# Patient Record
Sex: MALE | Race: BLACK OR AFRICAN AMERICAN | URBAN - METROPOLITAN AREA
[De-identification: names, ages, dates, MRNs, and addresses within clinical notes are randomized per-mention and may not be internally consistent; named-entity substitution may affect disease eponyms.]

---

## 2024-01-15 ENCOUNTER — CLAIMS CREATED FROM THE CLAIM WINDOW (OUTPATIENT)
Dept: URBAN - METROPOLITAN AREA MEDICAL CENTER 33 | Facility: MEDICAL CENTER | Age: 74
End: 2024-01-15
Payer: MEDICARE

## 2024-01-15 DIAGNOSIS — R57.1 HYPOVOLEMIC SHOCK: ICD-10-CM

## 2024-01-15 DIAGNOSIS — K26.4 BLEEDING DUODENAL ULCER: ICD-10-CM

## 2024-01-15 DIAGNOSIS — N17.9 ACUTE INJURY OF KIDNEY: ICD-10-CM

## 2024-01-15 DIAGNOSIS — K92.0 BLOODY EMESIS: ICD-10-CM

## 2024-01-15 DIAGNOSIS — D64.89 ANEMIA DUE TO OTHER CAUSE: ICD-10-CM

## 2024-01-15 PROCEDURE — 99255 IP/OBS CONSLTJ NEW/EST HI 80: CPT | Performed by: INTERNAL MEDICINE

## 2024-01-15 PROCEDURE — 43235 EGD DIAGNOSTIC BRUSH WASH: CPT | Performed by: INTERNAL MEDICINE

## 2024-01-15 PROCEDURE — 99223 1ST HOSP IP/OBS HIGH 75: CPT | Performed by: INTERNAL MEDICINE

## 2024-01-16 ENCOUNTER — CLAIMS CREATED FROM THE CLAIM WINDOW (OUTPATIENT)
Dept: URBAN - METROPOLITAN AREA MEDICAL CENTER 33 | Facility: MEDICAL CENTER | Age: 74
End: 2024-01-16
Payer: MEDICARE

## 2024-01-16 DIAGNOSIS — K92.0 BLOODY EMESIS: ICD-10-CM

## 2024-01-16 DIAGNOSIS — K92.1 ACUTE MELENA: ICD-10-CM

## 2024-01-16 DIAGNOSIS — D62 ABLA (ACUTE BLOOD LOSS ANEMIA): ICD-10-CM

## 2024-01-16 DIAGNOSIS — K26.4 BLEEDING DUODENAL ULCER: ICD-10-CM

## 2024-01-16 PROCEDURE — 99232 SBSQ HOSP IP/OBS MODERATE 35: CPT | Performed by: PHYSICIAN ASSISTANT

## 2024-01-17 ENCOUNTER — CLAIMS CREATED FROM THE CLAIM WINDOW (OUTPATIENT)
Dept: URBAN - METROPOLITAN AREA MEDICAL CENTER 33 | Facility: MEDICAL CENTER | Age: 74
End: 2024-01-17
Payer: MEDICARE

## 2024-01-17 DIAGNOSIS — K26.4 BLEEDING DUODENAL ULCER: ICD-10-CM

## 2024-01-17 DIAGNOSIS — N17.9 ACUTE INJURY OF KIDNEY: ICD-10-CM

## 2024-01-17 DIAGNOSIS — D62 ABLA (ACUTE BLOOD LOSS ANEMIA): ICD-10-CM

## 2024-01-17 DIAGNOSIS — K92.0 BLOODY EMESIS: ICD-10-CM

## 2024-01-17 PROCEDURE — 99232 SBSQ HOSP IP/OBS MODERATE 35: CPT | Performed by: PHYSICIAN ASSISTANT

## 2024-01-18 ENCOUNTER — CLAIMS CREATED FROM THE CLAIM WINDOW (OUTPATIENT)
Dept: URBAN - METROPOLITAN AREA MEDICAL CENTER 33 | Facility: MEDICAL CENTER | Age: 74
End: 2024-01-18
Payer: MEDICARE

## 2024-01-18 DIAGNOSIS — D62 ABLA (ACUTE BLOOD LOSS ANEMIA): ICD-10-CM

## 2024-01-18 DIAGNOSIS — K92.0 BLOODY EMESIS: ICD-10-CM

## 2024-01-18 DIAGNOSIS — K26.4 BLEEDING DUODENAL ULCER: ICD-10-CM

## 2024-01-18 DIAGNOSIS — N17.9 ACUTE INJURY OF KIDNEY: ICD-10-CM

## 2024-01-18 PROCEDURE — 99232 SBSQ HOSP IP/OBS MODERATE 35: CPT | Performed by: PHYSICIAN ASSISTANT

## 2024-01-20 ENCOUNTER — CLAIMS CREATED FROM THE CLAIM WINDOW (OUTPATIENT)
Dept: URBAN - METROPOLITAN AREA MEDICAL CENTER 33 | Facility: MEDICAL CENTER | Age: 74
End: 2024-01-20
Payer: MEDICARE

## 2024-01-20 DIAGNOSIS — D62 ABLA (ACUTE BLOOD LOSS ANEMIA): ICD-10-CM

## 2024-01-20 DIAGNOSIS — R13.12 DYSPHAGIA: ICD-10-CM

## 2024-01-20 DIAGNOSIS — K74.69 CIRRHOSIS, CRYPTOGENIC: ICD-10-CM

## 2024-01-20 DIAGNOSIS — K26.4 BLEEDING DUODENAL ULCER: ICD-10-CM

## 2024-01-20 PROCEDURE — 99232 SBSQ HOSP IP/OBS MODERATE 35: CPT | Performed by: INTERNAL MEDICINE

## 2024-01-21 ENCOUNTER — CLAIMS CREATED FROM THE CLAIM WINDOW (OUTPATIENT)
Dept: URBAN - METROPOLITAN AREA MEDICAL CENTER 33 | Facility: MEDICAL CENTER | Age: 74
End: 2024-01-21
Payer: MEDICARE

## 2024-01-21 DIAGNOSIS — K26.9 CHILDHOOD DUODENAL ULCER: ICD-10-CM

## 2024-01-21 DIAGNOSIS — K29.60 ADENOPAPILLOMATOSIS GASTRICA: ICD-10-CM

## 2024-01-21 DIAGNOSIS — D62 ABLA (ACUTE BLOOD LOSS ANEMIA): ICD-10-CM

## 2024-01-21 DIAGNOSIS — K31.A11 GASTRIC INTESTINAL METAPLASIA WITHOUT DYSPLASIA, INVOLVING THE ANTRUM: ICD-10-CM

## 2024-01-21 PROCEDURE — 43235 EGD DIAGNOSTIC BRUSH WASH: CPT | Performed by: INTERNAL MEDICINE

## 2024-01-21 PROCEDURE — 43239 EGD BIOPSY SINGLE/MULTIPLE: CPT | Performed by: INTERNAL MEDICINE

## 2024-02-09 ENCOUNTER — OV HOSPF/U (OUTPATIENT)
Dept: URBAN - METROPOLITAN AREA CLINIC 105 | Facility: CLINIC | Age: 74
End: 2024-02-09
Payer: MEDICARE

## 2024-02-09 VITALS
HEART RATE: 98 BPM | SYSTOLIC BLOOD PRESSURE: 94 MMHG | WEIGHT: 138 LBS | BODY MASS INDEX: 20.92 KG/M2 | HEIGHT: 68 IN | DIASTOLIC BLOOD PRESSURE: 54 MMHG | TEMPERATURE: 97.9 F

## 2024-02-09 DIAGNOSIS — D69.6 THROMBOCYTOPENIA: ICD-10-CM

## 2024-02-09 DIAGNOSIS — Z87.898 HISTORY OF SEIZURE: ICD-10-CM

## 2024-02-09 DIAGNOSIS — S06.9XAS TRAUMATIC BRAIN INJURY, WITH UNKNOWN LOSS OF CONSCIOUSNESS STATUS, SEQUELA: ICD-10-CM

## 2024-02-09 DIAGNOSIS — K26.4 GASTROINTESTINAL HEMORRHAGE ASSOCIATED WITH DUODENAL ULCER: ICD-10-CM

## 2024-02-09 DIAGNOSIS — K70.30 ALCOHOLIC CIRRHOSIS OF LIVER WITHOUT ASCITES: ICD-10-CM

## 2024-02-09 DIAGNOSIS — I10 ESSENTIAL HYPERTENSION: ICD-10-CM

## 2024-02-09 PROBLEM — 420054005: Status: ACTIVE | Noted: 2024-02-09

## 2024-02-09 PROBLEM — 74474003: Status: ACTIVE | Noted: 2024-02-09

## 2024-02-09 PROBLEM — 59621000: Status: ACTIVE | Noted: 2024-02-09

## 2024-02-09 PROBLEM — 302215000: Status: ACTIVE | Noted: 2024-02-09

## 2024-02-09 PROCEDURE — 99215 OFFICE O/P EST HI 40 MIN: CPT | Performed by: INTERNAL MEDICINE

## 2024-02-09 RX ORDER — LEVETIRACETAM 500 MG/1
1 TABLET TABLET, FILM COATED ORAL
Status: ACTIVE | COMMUNITY

## 2024-02-09 RX ORDER — SACCHAROMYCES BOULARDII 250 MG
AS DIRECTED CAPSULE ORAL
Status: ACTIVE | COMMUNITY

## 2024-02-09 RX ORDER — METOPROLOL TARTRATE 25 MG/1
1 TABLET WITH FOOD TABLET, FILM COATED ORAL TWICE A DAY
Status: ACTIVE | COMMUNITY

## 2024-02-09 RX ORDER — TAMSULOSIN HYDROCHLORIDE 0.4 MG/1
CAPSULE ORAL
Qty: 30 CAPSULE | Status: ACTIVE | COMMUNITY

## 2024-02-09 RX ORDER — PANTOPRAZOLE SODIUM 40 MG/1
1 TABLET TABLET, DELAYED RELEASE ORAL ONCE A DAY
Status: ACTIVE | COMMUNITY

## 2024-02-09 RX ORDER — NYSTATIN 100000 1/G
APPLY TOPICALLY TWICE DAILY TO GROIN/PUBIC AREA POWDER TOPICAL
Qty: 15 GRAM | Refills: 0 | Status: ACTIVE | COMMUNITY

## 2024-02-09 RX ORDER — CIPROFLOXACIN HYDROCHLORIDE 500 MG/1
TABLET, FILM COATED ORAL
Qty: 14 TABLET | Status: ACTIVE | COMMUNITY

## 2024-02-09 RX ORDER — ALBUTEROL SULFATE 90 UG/1
AEROSOL, METERED RESPIRATORY (INHALATION)
Qty: 8.5 GRAM | Status: ACTIVE | COMMUNITY

## 2024-02-09 NOTE — HPI-TODAY'S VISIT:
2/9/24 Here for post hospital visit 74 yo male with h/o TBI and seizures who I met at [facility] while on service last month Presented with massive hematemesis in the ER and had to be intubated for airway protection.  He had an EGD and large DU noted. Had to go to IR for coil embolization of the GDA DR Yost did another EGD subsequently and noted 15 mm DU w/o bleeding Pt is here with his brother and caregiver.  Med list notes he is on Pantoprazole and brother confirms he is taking that.  Since he presented to the ER with hematemesis he has been back for leg swelling and put on lasix. This is now resolved.  No abdominal pain. no nausea  or vomiting. Has regular BMs.  REviewed CT and CTA done on admission - nodular liver with low platelets, changes c.w emphysema. stool in the colon. Has had a colonoscopy with Dr Sonali Aldana - says less than 5 years ago and does not think he is due for another one.

## 2024-02-18 LAB
A/G RATIO: 1.2
ABSOLUTE BASOPHILS: 31
ABSOLUTE EOSINOPHILS: 98
ABSOLUTE LYMPHOCYTES: 610
ABSOLUTE MONOCYTES: 366
ABSOLUTE NEUTROPHILS: 4996
ACTIN (SMOOTH MUSCLE) ANTIBODY (IGG): <20
ALBUMIN: 3.8
ALKALINE PHOSPHATASE: 108
ALPHA-1-ANTITRYPSIN QN: 100
ALT (SGPT): 10
AST (SGOT): 17
BASOPHILS: 0.5
BILIRUBIN, TOTAL: 0.6
BUN/CREATININE RATIO: (no result)
BUN: 23
CALCIUM: 9.4
CARBON DIOXIDE, TOTAL: 25
CERULOPLASMIN: 29
CHLORIDE: 108
CREATININE: 1.26
EGFR: 60
EOSINOPHILS: 1.6
FERRITIN, SERUM: 211
GLOBULIN, TOTAL: 3.2
GLUCOSE: 70
HBSAG SCREEN: (no result)
HCV RNA, QUANTITATIVE REAL TIME PCR: (no result)
HCV RNA, QUANTITATIVE REAL TIME PCR: (no result)
HEMATOCRIT: 29.8
HEMOGLOBIN: 9.8
HEP A AB, IGM: (no result)
HEP B CORE AB, IGM: (no result)
HEPATITIS C ANTIBODY: REACTIVE
IRON BIND.CAP.(TIBC): 321
IRON SATURATION: 21
IRON: 66
LYMPHOCYTES: 10
MCH: 29.7
MCHC: 32.9
MCV: 90.3
MITOCHONDRIAL (M2) ANTIBODY: <=20
MONOCYTES: 6
MPV: 10.1
NEUTROPHILS: 81.9
PLATELET COUNT: 177
POTASSIUM: 5
PROTEIN, TOTAL: 7
RDW: 13.7
RED BLOOD CELL COUNT: 3.3
SODIUM: 142
WHITE BLOOD CELL COUNT: 6.1

## 2024-04-05 ENCOUNTER — LAB (OUTPATIENT)
Dept: URBAN - METROPOLITAN AREA CLINIC 105 | Facility: CLINIC | Age: 74
End: 2024-04-05

## 2024-04-05 ENCOUNTER — OV EP (OUTPATIENT)
Dept: URBAN - METROPOLITAN AREA CLINIC 105 | Facility: CLINIC | Age: 74
End: 2024-04-05
Payer: MEDICARE

## 2024-04-05 VITALS
HEART RATE: 112 BPM | BODY MASS INDEX: 22.13 KG/M2 | SYSTOLIC BLOOD PRESSURE: 157 MMHG | DIASTOLIC BLOOD PRESSURE: 91 MMHG | HEIGHT: 68 IN | TEMPERATURE: 97.8 F | WEIGHT: 146 LBS

## 2024-04-05 DIAGNOSIS — D69.6 THROMBOCYTOPENIA: ICD-10-CM

## 2024-04-05 DIAGNOSIS — F17.200 SMOKER: ICD-10-CM

## 2024-04-05 DIAGNOSIS — K70.30 ALCOHOLIC CIRRHOSIS OF LIVER WITHOUT ASCITES: ICD-10-CM

## 2024-04-05 DIAGNOSIS — I10 ESSENTIAL HYPERTENSION: ICD-10-CM

## 2024-04-05 DIAGNOSIS — S06.9XAS TRAUMATIC BRAIN INJURY, WITH UNKNOWN LOSS OF CONSCIOUSNESS STATUS, SEQUELA: ICD-10-CM

## 2024-04-05 DIAGNOSIS — K26.4 GASTROINTESTINAL HEMORRHAGE ASSOCIATED WITH DUODENAL ULCER: ICD-10-CM

## 2024-04-05 DIAGNOSIS — D12.6 COLON ADENOMAS: ICD-10-CM

## 2024-04-05 DIAGNOSIS — K57.30 DIVERTICULA, COLON: ICD-10-CM

## 2024-04-05 DIAGNOSIS — Z87.898 HISTORY OF SEIZURE: ICD-10-CM

## 2024-04-05 DIAGNOSIS — J43.9 PULMONARY EMPHYSEMA, UNSPECIFIED EMPHYSEMA TYPE: ICD-10-CM

## 2024-04-05 PROBLEM — 733657002: Status: ACTIVE | Noted: 2024-04-05

## 2024-04-05 PROBLEM — 77176002: Status: ACTIVE | Noted: 2024-04-05

## 2024-04-05 PROBLEM — 87433001: Status: ACTIVE | Noted: 2024-04-05

## 2024-04-05 PROCEDURE — 99214 OFFICE O/P EST MOD 30 MIN: CPT | Performed by: INTERNAL MEDICINE

## 2024-04-05 RX ORDER — LEVETIRACETAM 500 MG/1
1 TABLET TABLET, FILM COATED ORAL
Status: ACTIVE | COMMUNITY

## 2024-04-05 RX ORDER — TRIAMCINOLONE ACETONIDE 1 MG/G
APPLY TOPICALLY TWICE DAILY TO RASH ON THE ARM AND LOWER ABDOMEN ABOVE THE PUBIS CREAM TOPICAL
Qty: 30 GRAM | Refills: 0 | Status: ACTIVE | COMMUNITY

## 2024-04-05 RX ORDER — METOPROLOL TARTRATE 25 MG/1
1 TABLET WITH FOOD TABLET, FILM COATED ORAL TWICE A DAY
Status: ACTIVE | COMMUNITY

## 2024-04-05 RX ORDER — TAMSULOSIN HYDROCHLORIDE 0.4 MG/1
CAPSULE ORAL
Qty: 30 CAPSULE | Status: ACTIVE | COMMUNITY

## 2024-04-05 RX ORDER — BUDESONIDE, GLYCOPYRROLATE, AND FORMOTEROL FUMARATE 160; 9; 4.8 UG/1; UG/1; UG/1
INHALE 2 PUFFS BY MOUTH TWICE DAILY AEROSOL, METERED RESPIRATORY (INHALATION)
Qty: 10.7 GRAM | Refills: 0 | Status: ACTIVE | COMMUNITY

## 2024-04-05 RX ORDER — LOSARTAN POTASSIUM AND HYDROCHLOROTHIAZIDE 50; 12.5 MG/1; MG/1
TAKE 1 TABLET BY MOUTH EVERY DAY TABLET, FILM COATED ORAL
Qty: 90 EACH | Refills: 2 | Status: ACTIVE | COMMUNITY

## 2024-04-05 RX ORDER — CIPROFLOXACIN HYDROCHLORIDE 500 MG/1
TABLET, FILM COATED ORAL
Qty: 14 TABLET | Status: ACTIVE | COMMUNITY

## 2024-04-05 RX ORDER — NYSTATIN 100000 1/G
APPLY TOPICALLY TWICE DAILY TO GROIN/PUBIC AREA POWDER TOPICAL
Qty: 15 GRAM | Refills: 0 | Status: ACTIVE | COMMUNITY

## 2024-04-05 RX ORDER — PANTOPRAZOLE SODIUM 40 MG/1
1 TABLET TABLET, DELAYED RELEASE ORAL ONCE A DAY
Qty: 90 TABLET | Refills: 3 | OUTPATIENT
Start: 2024-04-05

## 2024-04-05 RX ORDER — ALBUTEROL SULFATE 90 UG/1
AEROSOL, METERED RESPIRATORY (INHALATION)
Qty: 8.5 GRAM | Status: ACTIVE | COMMUNITY

## 2024-04-05 RX ORDER — CEFUROXIME AXETIL 500 MG/1
TAKE 1 TABLET BY MOUTH TWICE DAILY FOR 7 DAYS TABLET ORAL
Qty: 14 EACH | Refills: 0 | Status: ACTIVE | COMMUNITY

## 2024-04-05 RX ORDER — PANTOPRAZOLE SODIUM 40 MG/1
1 TABLET TABLET, DELAYED RELEASE ORAL ONCE A DAY
Status: ACTIVE | COMMUNITY

## 2024-04-05 NOTE — HPI-TODAY'S VISIT:
2/9/24 Here for post hospital visit 72 yo male with h/o TBI and seizures who I met at [facility] while on service last month Presented with massive hematemesis in the ER and had to be intubated for airway protection.  He had an EGD and large DU noted. Had to go to IR for coil embolization of the GDA DR Yost did another EGD subsequently and noted 15 mm DU w/o bleeding Pt is here with his brother and caregiver.  Med list notes he is on Pantoprazole and brother confirms he is taking that.  Since he presented to the ER with hematemesis he has been back for leg swelling and put on lasix. This is now resolved.  No abdominal pain. no nausea  or vomiting. Has regular BMs.  REviewed CT and CTA done on admission - nodular liver with low platelets, changes c.w emphysema. stool in the colon. Has had a colonoscopy with Dr Sonali Aldana - says less than 5 years ago and does not think he is due for another one.  4/5/24 Here with niece Went over labs results - low Hb, cirrhosis, emphysema.  He is not drinking occasionally and smoking cigarettes.  Denies constipation.

## 2024-04-06 LAB
ABSOLUTE BASOPHILS: 31
ABSOLUTE EOSINOPHILS: 270
ABSOLUTE LYMPHOCYTES: 938
ABSOLUTE MONOCYTES: 709
ABSOLUTE NEUTROPHILS: 3152
BASOPHILS: 0.6
EOSINOPHILS: 5.3
HEMATOCRIT: 31.2
HEMOGLOBIN: 10.4
INR: 1.1
LYMPHOCYTES: 18.4
MCH: 28.3
MCHC: 33.3
MCV: 84.8
MONOCYTES: 13.9
MPV: 9.9
NEUTROPHILS: 61.8
PLATELET COUNT: 234
PT: 11.2
RDW: 12.5
RED BLOOD CELL COUNT: 3.68
WHITE BLOOD CELL COUNT: 5.1

## 2024-06-04 ENCOUNTER — DASHBOARD ENCOUNTERS (OUTPATIENT)
Age: 74
End: 2024-06-04

## 2024-06-07 ENCOUNTER — OFFICE VISIT (OUTPATIENT)
Dept: URBAN - METROPOLITAN AREA CLINIC 105 | Facility: CLINIC | Age: 74
End: 2024-06-07

## 2024-06-07 RX ORDER — PANTOPRAZOLE SODIUM 40 MG/1
1 TABLET TABLET, DELAYED RELEASE ORAL ONCE A DAY
Status: ACTIVE | COMMUNITY

## 2024-06-07 RX ORDER — CIPROFLOXACIN HYDROCHLORIDE 500 MG/1
TABLET, FILM COATED ORAL
Qty: 14 TABLET | Status: ACTIVE | COMMUNITY

## 2024-06-07 RX ORDER — METOPROLOL TARTRATE 25 MG/1
1 TABLET WITH FOOD TABLET, FILM COATED ORAL TWICE A DAY
Status: ACTIVE | COMMUNITY

## 2024-06-07 RX ORDER — LOSARTAN POTASSIUM AND HYDROCHLOROTHIAZIDE 50; 12.5 MG/1; MG/1
TAKE 1 TABLET BY MOUTH EVERY DAY TABLET, FILM COATED ORAL
Qty: 90 EACH | Refills: 2 | Status: ACTIVE | COMMUNITY

## 2024-06-07 RX ORDER — CEFUROXIME AXETIL 500 MG/1
TAKE 1 TABLET BY MOUTH TWICE DAILY FOR 7 DAYS TABLET ORAL
Qty: 14 EACH | Refills: 0 | Status: ACTIVE | COMMUNITY

## 2024-06-07 RX ORDER — ALBUTEROL SULFATE 90 UG/1
AEROSOL, METERED RESPIRATORY (INHALATION)
Qty: 8.5 GRAM | Status: ACTIVE | COMMUNITY

## 2024-06-07 RX ORDER — LEVETIRACETAM 500 MG/1
1 TABLET TABLET, FILM COATED ORAL
Status: ACTIVE | COMMUNITY

## 2024-06-07 RX ORDER — PANTOPRAZOLE SODIUM 40 MG/1
1 TABLET TABLET, DELAYED RELEASE ORAL ONCE A DAY
Qty: 90 TABLET | Refills: 3 | Status: ACTIVE | COMMUNITY
Start: 2024-04-05

## 2024-06-07 RX ORDER — TAMSULOSIN HYDROCHLORIDE 0.4 MG/1
CAPSULE ORAL
Qty: 30 CAPSULE | Status: ACTIVE | COMMUNITY

## 2024-06-07 RX ORDER — BUDESONIDE, GLYCOPYRROLATE, AND FORMOTEROL FUMARATE 160; 9; 4.8 UG/1; UG/1; UG/1
INHALE 2 PUFFS BY MOUTH TWICE DAILY AEROSOL, METERED RESPIRATORY (INHALATION)
Qty: 10.7 GRAM | Refills: 0 | Status: ACTIVE | COMMUNITY

## 2024-06-07 RX ORDER — NYSTATIN 100000 1/G
APPLY TOPICALLY TWICE DAILY TO GROIN/PUBIC AREA POWDER TOPICAL
Qty: 15 GRAM | Refills: 0 | Status: ACTIVE | COMMUNITY

## 2024-06-07 RX ORDER — TRIAMCINOLONE ACETONIDE 1 MG/G
APPLY TOPICALLY TWICE DAILY TO RASH ON THE ARM AND LOWER ABDOMEN ABOVE THE PUBIS CREAM TOPICAL
Qty: 30 GRAM | Refills: 0 | Status: ACTIVE | COMMUNITY